# Patient Record
Sex: FEMALE | Race: WHITE | NOT HISPANIC OR LATINO | URBAN - METROPOLITAN AREA
[De-identification: names, ages, dates, MRNs, and addresses within clinical notes are randomized per-mention and may not be internally consistent; named-entity substitution may affect disease eponyms.]

---

## 2019-08-21 ENCOUNTER — IMPORTED ENCOUNTER (OUTPATIENT)
Dept: URBAN - METROPOLITAN AREA CLINIC 1 | Facility: CLINIC | Age: 54
End: 2019-08-21

## 2019-08-21 PROBLEM — H05.011: Noted: 2019-08-21

## 2019-08-21 PROBLEM — H11.153: Noted: 2019-08-21

## 2019-08-21 PROBLEM — H25.093: Noted: 2019-08-21

## 2019-08-21 PROBLEM — H00.12: Noted: 2019-08-21

## 2019-08-21 PROCEDURE — 92002 INTRM OPH EXAM NEW PATIENT: CPT

## 2019-08-29 ENCOUNTER — IMPORTED ENCOUNTER (OUTPATIENT)
Dept: URBAN - METROPOLITAN AREA CLINIC 1 | Facility: CLINIC | Age: 54
End: 2019-08-29

## 2019-08-29 PROBLEM — H00.12: Noted: 2019-08-29

## 2019-08-29 PROBLEM — H04.123: Noted: 2019-08-29

## 2019-08-29 PROCEDURE — 92012 INTRM OPH EXAM EST PATIENT: CPT

## 2019-08-29 NOTE — PATIENT DISCUSSION
1. Small Residual Chalazion RLL: Cellulitis resolved. Use po Augmentin 500mg po till out then d/c; Expressed inspissated meibomian gland at slit lamp today by PMG with no complications. Continue Hot compresses TID x 5 minutes for 3 weeks. If without improvement discussed with patient possible Incision and Drainage procedure. Risks and benefits discussed with patient and patient states full understanding. 2. ALEXEY OU- Begin ATs TID OU routinely. (sample of PF Systane Ultra given) 3. Incipient Cataract OU- observe. 4.  Pinguecula OU -- Use of sunglasses when exposed to UV light and observation is recommended. 5. Pigmented Papilloma RLL- Appears benign. H/o Previous Skin CA Excision RLLReturn for an appointment in 1 year 27 with Dr. Kacie Benton.

## 2020-08-24 ENCOUNTER — IMPORTED ENCOUNTER (OUTPATIENT)
Dept: URBAN - METROPOLITAN AREA CLINIC 1 | Facility: CLINIC | Age: 55
End: 2020-08-24

## 2020-08-24 PROBLEM — H00.12: Noted: 2020-08-24

## 2020-08-24 PROCEDURE — 92012 INTRM OPH EXAM EST PATIENT: CPT

## 2020-08-24 NOTE — PATIENT DISCUSSION
1.  Chalazion RLL: Begin Hot compresses TID x 5 minutes for 3 weeks. 2. Cataract OU- observe. 3.  Pinguecula OU -- Use of sunglasses when exposed to UV light and observation is recommended. 4. 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 Consider possible Excisional Biopsy RLL Medially due to h/o previous skin cancer in same location RLL. Return for an appointment in 1 week 10/ Possible I&D (pt knows will need to return different day for I&D) PMG also eval Pigmented Papilloma RLL & LLL suspicious for recurrence of BCCA (Especially RLL Medial due to h/o previous Skin CA excised elsewhere) with Dr. Kwan Montenegro. Brittany Sensing for an appointment in 3 weeks 27 with Dr. Farida Arguello.

## 2020-09-15 ENCOUNTER — IMPORTED ENCOUNTER (OUTPATIENT)
Dept: URBAN - METROPOLITAN AREA CLINIC 1 | Facility: CLINIC | Age: 55
End: 2020-09-15

## 2020-09-15 PROBLEM — H04.123: Noted: 2020-09-15

## 2020-09-15 PROBLEM — H25.813: Noted: 2020-09-15

## 2020-09-15 PROBLEM — H11.001: Noted: 2020-09-15

## 2020-09-15 PROBLEM — H00.12: Noted: 2020-09-15

## 2020-09-15 PROBLEM — H11.003: Noted: 2020-09-15

## 2020-09-15 PROCEDURE — 92014 COMPRE OPH EXAM EST PT 1/>: CPT

## 2020-09-15 NOTE — PATIENT DISCUSSION
1.  Cataract OU -- Observe. 2.  Chalazion RLL -- Improving. Continue Hot compresses TID x 5 minutes until resolved. 3.  ALEXEY OU -- Recommend AT's BID OU. (Blink sample given)3. Pinguecula OU -- Use of sunglasses when exposed to UV light and observation is recommended. 4. Pigmented Papilloma RLL -- Appears benign. H/o Previous Skin CA Excision RLL. 5.  Pterygium OD -- Use Sunglasses when exposed to UV light. Pt deferred MRX will return under vision plan. Return for an appointment in PRN 40 with Dr. Vance Bernal. Return for an appointment in 1 year 27 with Dr. Vance Bernal.

## 2020-09-25 ENCOUNTER — IMPORTED ENCOUNTER (OUTPATIENT)
Dept: URBAN - METROPOLITAN AREA CLINIC 1 | Facility: CLINIC | Age: 55
End: 2020-09-25

## 2020-09-25 PROBLEM — H52.13: Noted: 2020-09-25

## 2020-09-25 PROCEDURE — S0621 ROUTINE OPHTHALMOLOGICAL EXA: HCPCS

## 2020-09-25 NOTE — PATIENT DISCUSSION
1. Myopia: Rx was given for correction if indicated and requested. 2. Cataract OU -- Observe. 3.  Chalazion RLL -- Improving. Continue Hot compresses TID x 5 minutes until resolved. 4.  ALEXEY OU -- Recommend AT's BID OU. (Blink sample given)5. Pinguecula OU -- Use of sunglasses when exposed to UV light and observation is recommended. 6. Pigmented Papilloma RLL -- Appears benign. H/o Previous Skin CA Excision RLL. 7.  Pterygium OD -- Use Sunglasses when exposed to UV light. Return for an appointment in 1 year 27 with Dr. Cherelle Schaeffer. Return for an appointment in 1 year 36 with Dr. Cherelle Schaeffer.

## 2021-09-24 ENCOUNTER — IMPORTED ENCOUNTER (OUTPATIENT)
Dept: URBAN - METROPOLITAN AREA CLINIC 1 | Facility: CLINIC | Age: 56
End: 2021-09-24

## 2021-09-24 PROBLEM — H52.4: Noted: 2021-09-24

## 2021-09-24 PROBLEM — H52.223: Noted: 2021-09-24

## 2021-09-24 PROBLEM — H52.13: Noted: 2021-09-24

## 2021-09-24 PROCEDURE — S0621 ROUTINE OPHTHALMOLOGICAL EXA: HCPCS

## 2021-09-24 NOTE — PATIENT DISCUSSION
1. Myopia w/ Astigmatism OU -- Rx was given for correction if indicated and requested. 2. Presbyopia3. Cataract OU -- Observe. 4.  Residual Chalazion RLL -- Continue Hot compresses TID x 5 minutes until resolved. 3 [address] Suspect OU -- (CD: 0.55/0.45) IOP 23 OU. Possible Fhx (Father)6. ALEXEY OU -- Recommend AT's BID OU. 7.  Pinguecula OU -- Use of sunglasses when exposed to UV light and observation is recommended. 8. Pigmented Papilloma RLL -- Appears benign. H/o Previous Skin CA Excision RLL. 9.  Pterygium OD -- Use Sunglasses when exposed to UV light. Return for an appointment in 6 months 30/OCT with Dr. Gretchen Chawla. Return for an appointment in 1 year 36 with Dr. Gretchen Chawla.

## 2022-03-25 ENCOUNTER — COMPREHENSIVE EXAM (OUTPATIENT)
Dept: URBAN - METROPOLITAN AREA CLINIC 1 | Facility: CLINIC | Age: 57
End: 2022-03-25

## 2022-03-25 DIAGNOSIS — H40.013: ICD-10-CM

## 2022-03-25 DIAGNOSIS — H25.813: ICD-10-CM

## 2022-03-25 DIAGNOSIS — H04.123: ICD-10-CM

## 2022-03-25 DIAGNOSIS — H11.041: ICD-10-CM

## 2022-03-25 DIAGNOSIS — H11.153: ICD-10-CM

## 2022-03-25 PROCEDURE — 92133 CPTRZD OPH DX IMG PST SGM ON: CPT

## 2022-03-25 PROCEDURE — 92014 COMPRE OPH EXAM EST PT 1/>: CPT

## 2022-03-25 ASSESSMENT — VISUAL ACUITY
OD_CC: 20/20
OS_CC: J1+
OD_CC: J1+
OS_CC: 20/20
OS_BAT: 20/60
OD_BAT: 20/60

## 2022-03-25 ASSESSMENT — TONOMETRY
OD_IOP_MMHG: 22
OS_IOP_MMHG: 21

## 2022-03-25 NOTE — PATIENT DISCUSSION
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 IOP stable, 22/21. Possible +Family hx (Father). Patient is considered low risk. Condition was discussed with patient and patient understands. Will continue to monitor patient for any progression in condition. Patient was advised to call us with any problems, questions, or concerns.

## 2022-04-02 ASSESSMENT — VISUAL ACUITY
OD_CC: J1
OS_SC: 20/20
OS_CC: 20/20
OD_SC: 20/20
OS_SC: 20/20
OS_CC: J1+
OS_CC: J1+
OS_CC: J1
OD_CC: 20/20
OD_SC: 20/20
OD_CC: J1+
OD_CC: 20/20-2
OS_SC: 20/20
OD_CC: J1+
OD_SC: 20/20-2
OD_SC: 20/20
OD_SC: 20/20
OS_SC: 20/20
OS_SC: 20/20-2
OS_CC: 20/20-1

## 2022-04-02 ASSESSMENT — TONOMETRY
OD_IOP_MMHG: 16
OD_IOP_MMHG: 23
OS_IOP_MMHG: 17
OS_IOP_MMHG: 15
OD_IOP_MMHG: 15
OD_IOP_MMHG: 18
OS_IOP_MMHG: 23
OS_IOP_MMHG: 18

## 2022-04-11 ENCOUNTER — HOSPITAL ENCOUNTER (OUTPATIENT)
Dept: LAB | Age: 57
Discharge: HOME | End: 2022-04-11
Payer: COMMERCIAL

## 2022-04-11 PROCEDURE — 88304 TISSUE EXAM BY PATHOLOGIST: CPT

## 2022-10-28 ENCOUNTER — COMPREHENSIVE EXAM (OUTPATIENT)
Dept: URBAN - METROPOLITAN AREA CLINIC 1 | Facility: CLINIC | Age: 57
End: 2022-10-28

## 2022-10-28 DIAGNOSIS — Z01.00: ICD-10-CM

## 2022-10-28 PROCEDURE — 92014 COMPRE OPH EXAM EST PT 1/>: CPT

## 2022-10-28 ASSESSMENT — TONOMETRY
OD_IOP_MMHG: 23
OS_IOP_MMHG: 21

## 2022-10-28 ASSESSMENT — VISUAL ACUITY
OD_CC: J1
OD_CC: 20/20
OS_CC: 20/20
OS_CC: J1

## 2022-10-28 NOTE — PATIENT DISCUSSION
(CD 0.55/0.45) - IOP stable, 23/21. Possible +Family hx (Father). Patient is considered low risk. Condition was discussed with patient and patient understands. Will continue to monitor patient for any progression in condition. Patient was advised to call us with any problems, questions, or concerns.

## 2023-11-10 ENCOUNTER — COMPREHENSIVE EXAM (OUTPATIENT)
Dept: URBAN - METROPOLITAN AREA CLINIC 1 | Facility: CLINIC | Age: 58
End: 2023-11-10

## 2023-11-10 DIAGNOSIS — H52.13: ICD-10-CM

## 2023-11-10 DIAGNOSIS — Z01.00: ICD-10-CM

## 2023-11-10 DIAGNOSIS — H52.223: ICD-10-CM

## 2023-11-10 PROCEDURE — 92014 COMPRE OPH EXAM EST PT 1/>: CPT

## 2023-11-10 PROCEDURE — 92015 DETERMINE REFRACTIVE STATE: CPT

## 2023-11-10 ASSESSMENT — TONOMETRY
OD_IOP_MMHG: 20
OS_IOP_MMHG: 21

## 2023-11-10 ASSESSMENT — VISUAL ACUITY
OD_CC: 20/30
OS_CC: 20/25-2

## 2024-05-17 ENCOUNTER — COMPREHENSIVE EXAM (OUTPATIENT)
Dept: URBAN - METROPOLITAN AREA CLINIC 1 | Facility: CLINIC | Age: 59
End: 2024-05-17

## 2024-05-17 DIAGNOSIS — H11.041: ICD-10-CM

## 2024-05-17 DIAGNOSIS — H04.123: ICD-10-CM

## 2024-05-17 DIAGNOSIS — D23.112: ICD-10-CM

## 2024-05-17 DIAGNOSIS — H11.153: ICD-10-CM

## 2024-05-17 DIAGNOSIS — H25.813: ICD-10-CM

## 2024-05-17 DIAGNOSIS — H40.013: ICD-10-CM

## 2024-05-17 PROCEDURE — 92133 CPTRZD OPH DX IMG PST SGM ON: CPT

## 2024-05-17 PROCEDURE — 92014 COMPRE OPH EXAM EST PT 1/>: CPT

## 2024-05-17 ASSESSMENT — VISUAL ACUITY
OD_CC: 20/20-1
OD_CC: J1+
OS_CC: J1+
OS_CC: 20/20

## 2024-05-17 ASSESSMENT — TONOMETRY
OS_IOP_MMHG: 14
OD_IOP_MMHG: 13

## 2024-11-15 ENCOUNTER — COMPREHENSIVE EXAM (OUTPATIENT)
Dept: URBAN - METROPOLITAN AREA CLINIC 1 | Facility: CLINIC | Age: 59
End: 2024-11-15

## 2024-11-15 DIAGNOSIS — H52.13: ICD-10-CM

## 2024-11-15 DIAGNOSIS — H52.4: ICD-10-CM

## 2024-11-15 DIAGNOSIS — H52.223: ICD-10-CM

## 2024-11-15 DIAGNOSIS — Z01.00: ICD-10-CM

## 2024-11-15 PROCEDURE — 92015 DETERMINE REFRACTIVE STATE: CPT

## 2024-11-15 PROCEDURE — 92014 COMPRE OPH EXAM EST PT 1/>: CPT

## 2025-06-20 ENCOUNTER — COMPREHENSIVE EXAM (OUTPATIENT)
Age: 60
End: 2025-06-20

## 2025-06-20 DIAGNOSIS — H04.123: ICD-10-CM

## 2025-06-20 DIAGNOSIS — D23.112: ICD-10-CM

## 2025-06-20 DIAGNOSIS — H11.041: ICD-10-CM

## 2025-06-20 DIAGNOSIS — H40.013: ICD-10-CM

## 2025-06-20 DIAGNOSIS — H25.813: ICD-10-CM

## 2025-06-20 DIAGNOSIS — H11.153: ICD-10-CM

## 2025-06-20 PROCEDURE — 92014 COMPRE OPH EXAM EST PT 1/>: CPT

## 2025-06-20 PROCEDURE — 92133 CPTRZD OPH DX IMG PST SGM ON: CPT
